# Patient Record
Sex: MALE | Race: BLACK OR AFRICAN AMERICAN | NOT HISPANIC OR LATINO | Employment: OTHER | URBAN - METROPOLITAN AREA
[De-identification: names, ages, dates, MRNs, and addresses within clinical notes are randomized per-mention and may not be internally consistent; named-entity substitution may affect disease eponyms.]

---

## 2023-06-30 PROBLEM — J38.3 LEUKOPLAKIA OF VOCAL CORDS: Status: ACTIVE | Noted: 2023-06-30

## 2023-07-20 PROBLEM — K02.9 DENTAL CARIES: Status: ACTIVE | Noted: 2023-07-20

## 2023-08-02 PROBLEM — R22.1 NECK MASS: Status: ACTIVE | Noted: 2023-08-02

## 2023-09-26 PROBLEM — J98.59 MEDIASTINAL MASS: Status: ACTIVE | Noted: 2023-09-26

## 2023-10-25 PROBLEM — C79.51 CANCER, METASTATIC TO BONE: Status: ACTIVE | Noted: 2023-10-25

## 2023-10-25 PROBLEM — C34.90: Status: ACTIVE | Noted: 2023-10-25

## 2023-10-25 PROBLEM — C77.8: Status: ACTIVE | Noted: 2023-10-25

## 2023-12-11 PROBLEM — R11.2 CINV (CHEMOTHERAPY-INDUCED NAUSEA AND VOMITING): Status: ACTIVE | Noted: 2023-12-11

## 2023-12-11 PROBLEM — T45.1X5A CINV (CHEMOTHERAPY-INDUCED NAUSEA AND VOMITING): Status: ACTIVE | Noted: 2023-12-11

## 2023-12-11 PROBLEM — R59.0 LOCALIZED ENLARGED LYMPH NODES: Status: ACTIVE | Noted: 2023-12-11

## 2023-12-13 PROBLEM — T45.1X5A CHEMOTHERAPY-INDUCED NEUTROPENIA: Status: ACTIVE | Noted: 2023-12-13

## 2023-12-13 PROBLEM — D70.1 CHEMOTHERAPY-INDUCED NEUTROPENIA: Status: ACTIVE | Noted: 2023-12-13

## 2024-06-10 ENCOUNTER — CLINICAL SUPPORT (OUTPATIENT)
Dept: SMOKING CESSATION | Facility: CLINIC | Age: 59
End: 2024-06-10
Attending: SURGERY
Payer: COMMERCIAL

## 2024-06-10 DIAGNOSIS — F17.200 NICOTINE DEPENDENCE: Primary | ICD-10-CM

## 2024-06-10 RX ORDER — IBUPROFEN 200 MG
1 TABLET ORAL DAILY
Qty: 14 PATCH | Refills: 0 | Status: SHIPPED | OUTPATIENT
Start: 2024-06-10

## 2024-06-10 RX ORDER — MICONAZOLE NITRATE 2 %
2 CREAM (GRAM) TOPICAL
Qty: 100 EACH | Refills: 0 | Status: SHIPPED | OUTPATIENT
Start: 2024-06-10

## 2024-06-24 ENCOUNTER — CLINICAL SUPPORT (OUTPATIENT)
Dept: SMOKING CESSATION | Facility: CLINIC | Age: 59
End: 2024-06-24
Attending: SURGERY

## 2024-06-24 DIAGNOSIS — F17.200 NICOTINE DEPENDENCE: Primary | ICD-10-CM

## 2024-06-24 NOTE — PROGRESS NOTES
Individual Follow-Up Form    6/24/2024    Quit Date: TBD    Clinical Status of Patient: Outpatient    Length of Service: 30 minutes    Continuing Medication: yes  Patches    Other Medications: lozenges     Target Symptoms: Withdrawal and medication side effects. The following were  rated moderate (3) to severe (4) on TCRS:  Moderate (3): none  Severe (4): none    Comments: Followed up with patient by phone. Patient is smoking 3-4 cigarettes per day. Orientation, client introductions, completion of TCRS (Tobacco Cessation Rating Scale) learned addiction model, cues/triggers, personal reasons for quitting, medications, goals, quit date. The patient will continue with  therapy sessions and medication monitoring by CTTS. Prescribed medication management will be by physician. The patient denies any abnormal behavioral or mental changes at this time.      Diagnosis: F17.210    Next Visit: 2 weeks

## 2024-09-26 ENCOUNTER — CLINICAL SUPPORT (OUTPATIENT)
Dept: SMOKING CESSATION | Facility: CLINIC | Age: 59
End: 2024-09-26
Payer: COMMERCIAL

## 2024-09-26 DIAGNOSIS — F17.200 NICOTINE DEPENDENCE: Primary | ICD-10-CM

## 2024-09-26 NOTE — PROGRESS NOTES
Spoke with patient today in regard to smoking cessation progress for 3 month telephone follow up, he states not tobacco free. Patient states the use of the nicotine patch but thought it was giving him headaches. He states not ready to return to the program at this time. Informed patient of benefit period, future follow ups, and contact information if any further help or support is needed. Will complete smart form for 3 month follow up on Quit attempt #1.

## 2024-10-23 PROBLEM — J41.0 SIMPLE CHRONIC BRONCHITIS: Status: ACTIVE | Noted: 2024-10-23

## 2024-12-11 ENCOUNTER — CLINICAL SUPPORT (OUTPATIENT)
Dept: SMOKING CESSATION | Facility: CLINIC | Age: 59
End: 2024-12-11
Payer: MEDICAID

## 2024-12-11 DIAGNOSIS — F17.200 NICOTINE DEPENDENCE: Primary | ICD-10-CM

## 2025-02-06 PROBLEM — C34.90 SMALL CELL CARCINOMA OF LUNG: Status: ACTIVE | Noted: 2025-02-06

## 2025-02-07 PROBLEM — M62.838 MUSCLE SPASMS OF NECK: Status: ACTIVE | Noted: 2025-02-07

## 2025-02-10 PROBLEM — R56.9 SEIZURE: Status: ACTIVE | Noted: 2025-02-10

## 2025-02-10 PROBLEM — G93.89 BRAIN MASS: Status: ACTIVE | Noted: 2025-02-10

## 2025-04-07 PROBLEM — E87.1 HYPONATREMIA: Status: ACTIVE | Noted: 2025-04-07

## 2025-04-07 PROBLEM — M25.552 LEFT HIP PAIN: Status: ACTIVE | Noted: 2025-04-07

## 2025-04-07 PROBLEM — D64.9 ANEMIA: Status: ACTIVE | Noted: 2025-04-07

## 2025-04-07 PROBLEM — R56.9 SEIZURES: Status: ACTIVE | Noted: 2025-04-07

## 2025-04-07 PROBLEM — D72.829 LEUKOCYTOSIS: Status: ACTIVE | Noted: 2025-04-07

## 2025-04-07 PROBLEM — T80.212A: Status: ACTIVE | Noted: 2025-04-07

## 2025-04-07 PROBLEM — M79.89 LEFT LEG SWELLING: Status: ACTIVE | Noted: 2025-04-07

## 2025-04-07 PROBLEM — C79.31 METASTASIS TO BRAIN: Status: ACTIVE | Noted: 2025-04-07

## 2025-04-08 PROBLEM — A41.9 SEPSIS: Status: ACTIVE | Noted: 2025-04-08

## 2025-04-08 PROBLEM — R19.7 DIARRHEA: Status: ACTIVE | Noted: 2025-04-08

## 2025-04-08 PROBLEM — R07.9 CHEST PAIN: Status: ACTIVE | Noted: 2025-04-08

## 2025-04-09 PROBLEM — D72.829 LEUKOCYTOSIS: Status: RESOLVED | Noted: 2025-04-07 | Resolved: 2025-04-09

## 2025-04-09 PROBLEM — R00.0 TACHYCARDIA: Status: ACTIVE | Noted: 2025-04-09

## 2025-04-10 PROBLEM — M00.9 PYOGENIC ARTHRITIS OF LEFT HIP: Status: ACTIVE | Noted: 2025-04-10

## 2025-04-10 PROBLEM — M87.9 BILATERAL HIP OSTEONECROSIS: Status: ACTIVE | Noted: 2025-04-10

## 2025-04-10 PROBLEM — A41.9 SEPSIS: Status: RESOLVED | Noted: 2025-04-08 | Resolved: 2025-04-10

## 2025-04-11 PROBLEM — R07.9 CHEST PAIN: Status: RESOLVED | Noted: 2025-04-08 | Resolved: 2025-04-11

## 2025-04-11 PROBLEM — M86.152: Status: ACTIVE | Noted: 2025-04-11

## 2025-04-11 PROBLEM — R19.7 DIARRHEA: Status: RESOLVED | Noted: 2025-04-08 | Resolved: 2025-04-11

## 2025-04-11 PROBLEM — M86.9 HIP OSTEOMYELITIS, LEFT: Status: ACTIVE | Noted: 2025-04-11

## 2025-04-11 PROBLEM — E83.42 HYPOMAGNESEMIA: Status: ACTIVE | Noted: 2025-04-11

## 2025-04-11 PROBLEM — E87.1 HYPONATREMIA: Status: RESOLVED | Noted: 2025-04-07 | Resolved: 2025-04-11

## 2025-04-14 PROBLEM — K59.00 CONSTIPATION: Status: ACTIVE | Noted: 2025-04-14

## 2025-04-14 PROBLEM — K65.1 PELVIC ABSCESS IN MALE: Status: ACTIVE | Noted: 2025-04-14

## 2025-04-14 PROBLEM — R45.89 DYSPHORIC MOOD: Status: ACTIVE | Noted: 2025-04-14

## 2025-04-15 PROBLEM — E83.42 HYPOMAGNESEMIA: Status: RESOLVED | Noted: 2025-04-11 | Resolved: 2025-04-15

## 2025-04-15 PROBLEM — M79.89 LEFT LEG SWELLING: Status: RESOLVED | Noted: 2025-04-07 | Resolved: 2025-04-15

## 2025-04-16 PROBLEM — M25.512 ACUTE PAIN OF LEFT SHOULDER: Status: ACTIVE | Noted: 2025-04-16

## 2025-04-19 PROBLEM — S42.92XA CLOSED FRACTURE OF LEFT SHOULDER: Status: ACTIVE | Noted: 2025-04-19

## 2025-06-04 ENCOUNTER — TELEPHONE (OUTPATIENT)
Dept: SMOKING CESSATION | Facility: CLINIC | Age: 60
End: 2025-06-04
Payer: MEDICAID

## 2025-06-16 ENCOUNTER — CLINICAL SUPPORT (OUTPATIENT)
Dept: SMOKING CESSATION | Facility: CLINIC | Age: 60
End: 2025-06-16
Payer: MEDICAID

## 2025-06-16 DIAGNOSIS — F17.200 NICOTINE DEPENDENCE, UNCOMPLICATED: Primary | ICD-10-CM

## 2025-06-16 PROCEDURE — 99407 BEHAV CHNG SMOKING > 10 MIN: CPT | Mod: S$PBB,,, | Performed by: FAMILY MEDICINE

## 2025-06-16 NOTE — PROGRESS NOTES
Spoke with patient today in regard to smoking cessation progress for 12 month follow up. He states he is not tobacco free but reduced his intake of cigarettes. Congratulated patient on his reduction. Patient was not interested in scheduling an appointment. Informed patient of benefit period, future follow ups and contact information if any further help is needed. Will resolve smart form for 12 month follow up for Quit # 1.